# Patient Record
Sex: MALE | Race: WHITE
[De-identification: names, ages, dates, MRNs, and addresses within clinical notes are randomized per-mention and may not be internally consistent; named-entity substitution may affect disease eponyms.]

---

## 2017-06-19 ENCOUNTER — HOSPITAL ENCOUNTER (OUTPATIENT)
Dept: HOSPITAL 8 - OUT | Age: 47
Discharge: HOME | End: 2017-06-19
Attending: NEUROLOGICAL SURGERY
Payer: MEDICARE

## 2017-06-19 VITALS — HEIGHT: 75 IN | BODY MASS INDEX: 33.17 KG/M2 | WEIGHT: 266.76 LBS

## 2017-06-19 VITALS — SYSTOLIC BLOOD PRESSURE: 131 MMHG | DIASTOLIC BLOOD PRESSURE: 90 MMHG

## 2017-06-19 DIAGNOSIS — I42.9: ICD-10-CM

## 2017-06-19 DIAGNOSIS — Z88.6: ICD-10-CM

## 2017-06-19 DIAGNOSIS — I50.9: ICD-10-CM

## 2017-06-19 DIAGNOSIS — M51.36: ICD-10-CM

## 2017-06-19 DIAGNOSIS — M48.06: Primary | ICD-10-CM

## 2017-06-19 PROCEDURE — 77003 FLUOROGUIDE FOR SPINE INJECT: CPT

## 2017-06-19 PROCEDURE — 72132 CT LUMBAR SPINE W/DYE: CPT

## 2017-06-19 PROCEDURE — 72120 X-RAY BEND ONLY L-S SPINE: CPT

## 2017-06-19 PROCEDURE — 62284 INJECTION FOR MYELOGRAM: CPT

## 2017-10-03 ENCOUNTER — HOSPITAL ENCOUNTER (OUTPATIENT)
Dept: HOSPITAL 8 - STAR | Age: 47
Discharge: HOME | End: 2017-10-03
Attending: NEUROLOGICAL SURGERY
Payer: MEDICARE

## 2017-10-03 DIAGNOSIS — Z01.818: Primary | ICD-10-CM

## 2017-10-03 DIAGNOSIS — I50.9: ICD-10-CM

## 2017-10-03 DIAGNOSIS — Z87.891: ICD-10-CM

## 2017-10-03 DIAGNOSIS — M48.02: ICD-10-CM

## 2017-10-03 DIAGNOSIS — Z95.0: ICD-10-CM

## 2017-10-03 LAB
AST SERPL-CCNC: 20 U/L (ref 15–37)
BUN SERPL-MCNC: 10 MG/DL (ref 7–18)
HCT VFR BLD CALC: 46.9 % (ref 39.2–51.8)
HGB BLD-MCNC: 15.8 G/DL (ref 13.7–18)
WBC # BLD AUTO: 9.7 X10^3/UL (ref 3.4–10)

## 2017-10-03 PROCEDURE — 71020: CPT

## 2017-10-03 PROCEDURE — 85025 COMPLETE CBC W/AUTO DIFF WBC: CPT

## 2017-10-03 PROCEDURE — 85610 PROTHROMBIN TIME: CPT

## 2017-10-03 PROCEDURE — 36415 COLL VENOUS BLD VENIPUNCTURE: CPT

## 2017-10-03 PROCEDURE — 85730 THROMBOPLASTIN TIME PARTIAL: CPT

## 2017-10-03 PROCEDURE — 80053 COMPREHEN METABOLIC PANEL: CPT

## 2017-10-03 PROCEDURE — 93005 ELECTROCARDIOGRAM TRACING: CPT

## 2017-10-09 ENCOUNTER — HOSPITAL ENCOUNTER (OUTPATIENT)
Dept: HOSPITAL 8 - CVU | Age: 47
Discharge: HOME | End: 2017-10-09
Attending: INTERNAL MEDICINE
Payer: MEDICARE

## 2017-10-09 DIAGNOSIS — I51.7: Primary | ICD-10-CM

## 2017-10-09 DIAGNOSIS — I42.9: ICD-10-CM

## 2017-10-09 DIAGNOSIS — I37.1: ICD-10-CM

## 2017-10-09 DIAGNOSIS — Z95.810: ICD-10-CM

## 2017-10-09 PROCEDURE — 93306 TTE W/DOPPLER COMPLETE: CPT

## 2017-10-10 ENCOUNTER — HOSPITAL ENCOUNTER (OUTPATIENT)
Dept: HOSPITAL 8 - OUT | Age: 47
Discharge: HOME | End: 2017-10-10
Attending: NEUROLOGICAL SURGERY
Payer: MEDICARE

## 2017-10-10 VITALS — HEIGHT: 74 IN | BODY MASS INDEX: 33.1 KG/M2 | WEIGHT: 257.94 LBS

## 2017-10-10 VITALS — SYSTOLIC BLOOD PRESSURE: 107 MMHG | DIASTOLIC BLOOD PRESSURE: 69 MMHG

## 2017-10-10 DIAGNOSIS — Z87.891: ICD-10-CM

## 2017-10-10 DIAGNOSIS — M54.16: ICD-10-CM

## 2017-10-10 DIAGNOSIS — M47.816: Primary | ICD-10-CM

## 2017-10-10 DIAGNOSIS — Z72.89: ICD-10-CM

## 2017-10-10 DIAGNOSIS — Z88.5: ICD-10-CM

## 2017-10-10 DIAGNOSIS — Z87.39: ICD-10-CM

## 2017-10-10 PROCEDURE — 63056 DECOMPRESS SPINAL CORD LMBR: CPT

## 2017-10-10 PROCEDURE — 72100 X-RAY EXAM L-S SPINE 2/3 VWS: CPT

## 2017-10-10 PROCEDURE — 63047 LAM FACETEC & FORAMOT LUMBAR: CPT

## 2017-10-10 RX ADMIN — FENTANYL CITRATE PRN MCG: 50 INJECTION INTRAMUSCULAR; INTRAVENOUS at 15:10

## 2017-10-10 RX ADMIN — FENTANYL CITRATE PRN MCG: 50 INJECTION INTRAMUSCULAR; INTRAVENOUS at 15:51

## 2017-10-10 RX ADMIN — HYDROMORPHONE HYDROCHLORIDE PRN MG: 1 INJECTION, SOLUTION INTRAMUSCULAR; INTRAVENOUS; SUBCUTANEOUS at 15:49

## 2017-10-10 RX ADMIN — HYDROMORPHONE HYDROCHLORIDE PRN MG: 1 INJECTION, SOLUTION INTRAMUSCULAR; INTRAVENOUS; SUBCUTANEOUS at 15:44

## 2017-10-10 RX ADMIN — FENTANYL CITRATE PRN MCG: 50 INJECTION INTRAMUSCULAR; INTRAVENOUS at 15:17

## 2019-01-22 ENCOUNTER — HOSPITAL ENCOUNTER (OUTPATIENT)
Dept: HOSPITAL 8 - CVU | Age: 49
Discharge: HOME | End: 2019-01-22
Attending: INTERNAL MEDICINE
Payer: MEDICARE

## 2019-01-22 DIAGNOSIS — I42.9: Primary | ICD-10-CM

## 2019-01-22 DIAGNOSIS — Z86.79: ICD-10-CM

## 2019-01-22 PROCEDURE — 93306 TTE W/DOPPLER COMPLETE: CPT

## 2019-03-23 ENCOUNTER — HOSPITAL ENCOUNTER (INPATIENT)
Dept: HOSPITAL 8 - ED | Age: 49
LOS: 1 days | Discharge: HOME | DRG: 313 | End: 2019-03-24
Attending: HOSPITALIST | Admitting: HOSPITALIST
Payer: MEDICARE

## 2019-03-23 VITALS — WEIGHT: 293.66 LBS | BODY MASS INDEX: 37.69 KG/M2 | HEIGHT: 74 IN

## 2019-03-23 VITALS — SYSTOLIC BLOOD PRESSURE: 136 MMHG | DIASTOLIC BLOOD PRESSURE: 76 MMHG

## 2019-03-23 DIAGNOSIS — E66.2: ICD-10-CM

## 2019-03-23 DIAGNOSIS — Z87.891: ICD-10-CM

## 2019-03-23 DIAGNOSIS — I50.9: ICD-10-CM

## 2019-03-23 DIAGNOSIS — M10.9: ICD-10-CM

## 2019-03-23 DIAGNOSIS — I11.0: ICD-10-CM

## 2019-03-23 DIAGNOSIS — I42.9: ICD-10-CM

## 2019-03-23 DIAGNOSIS — K21.9: ICD-10-CM

## 2019-03-23 DIAGNOSIS — Z23: ICD-10-CM

## 2019-03-23 DIAGNOSIS — Z98.84: ICD-10-CM

## 2019-03-23 DIAGNOSIS — R07.89: Primary | ICD-10-CM

## 2019-03-23 DIAGNOSIS — H53.8: ICD-10-CM

## 2019-03-23 DIAGNOSIS — Z95.810: ICD-10-CM

## 2019-03-23 LAB
ALBUMIN SERPL-MCNC: 4.1 G/DL (ref 3.4–5)
ALP SERPL-CCNC: 109 U/L (ref 45–117)
ALT SERPL-CCNC: 24 U/L (ref 12–78)
ANION GAP SERPL CALC-SCNC: 7 MMOL/L (ref 5–15)
BASOPHILS # BLD AUTO: 0.02 X10^3/UL (ref 0–0.1)
BASOPHILS NFR BLD AUTO: 0 % (ref 0–1)
BILIRUB SERPL-MCNC: 0.6 MG/DL (ref 0.2–1)
CALCIUM SERPL-MCNC: 8.5 MG/DL (ref 8.5–10.1)
CHLORIDE SERPL-SCNC: 103 MMOL/L (ref 98–107)
CREAT SERPL-MCNC: 1.12 MG/DL (ref 0.7–1.3)
EOSINOPHIL # BLD AUTO: 0.16 X10^3/UL (ref 0–0.4)
EOSINOPHIL NFR BLD AUTO: 2 % (ref 1–7)
ERYTHROCYTE [DISTWIDTH] IN BLOOD BY AUTOMATED COUNT: 15.4 % (ref 9.4–14.8)
LYMPHOCYTES # BLD AUTO: 2.16 X10^3/UL (ref 1–3.4)
LYMPHOCYTES NFR BLD AUTO: 24 % (ref 22–44)
MCH RBC QN AUTO: 28.2 PG (ref 27.5–34.5)
MCHC RBC AUTO-ENTMCNC: 32.8 G/DL (ref 33.2–36.2)
MCV RBC AUTO: 85.7 FL (ref 81–97)
MD: NO
MONOCYTES # BLD AUTO: 0.58 X10^3/UL (ref 0.2–0.8)
MONOCYTES NFR BLD AUTO: 6 % (ref 2–9)
NEUTROPHILS # BLD AUTO: 6.26 X10^3/UL (ref 1.8–6.8)
NEUTROPHILS NFR BLD AUTO: 68 % (ref 42–75)
PLATELET # BLD AUTO: 292 X10^3/UL (ref 130–400)
PMV BLD AUTO: 7.4 FL (ref 7.4–10.4)
PROT SERPL-MCNC: 7.1 G/DL (ref 6.4–8.2)
RBC # BLD AUTO: 4.66 X10^6/UL (ref 4.38–5.82)
TROPONIN I SERPL-MCNC: < 0.015 NG/ML (ref 0–0.04)
TROPONIN I SERPL-MCNC: < 0.015 NG/ML (ref 0–0.04)

## 2019-03-23 PROCEDURE — 80053 COMPREHEN METABOLIC PANEL: CPT

## 2019-03-23 PROCEDURE — C9898 INPNT STAY RADIOLABELED ITEM: HCPCS

## 2019-03-23 PROCEDURE — 78452 HT MUSCLE IMAGE SPECT MULT: CPT

## 2019-03-23 PROCEDURE — 70450 CT HEAD/BRAIN W/O DYE: CPT

## 2019-03-23 PROCEDURE — 80061 LIPID PANEL: CPT

## 2019-03-23 PROCEDURE — 71045 X-RAY EXAM CHEST 1 VIEW: CPT

## 2019-03-23 PROCEDURE — 93308 TTE F-UP OR LMTD: CPT

## 2019-03-23 PROCEDURE — 84484 ASSAY OF TROPONIN QUANT: CPT

## 2019-03-23 PROCEDURE — 93325 DOPPLER ECHO COLOR FLOW MAPG: CPT

## 2019-03-23 PROCEDURE — 83880 ASSAY OF NATRIURETIC PEPTIDE: CPT

## 2019-03-23 PROCEDURE — 80048 BASIC METABOLIC PNL TOTAL CA: CPT

## 2019-03-23 PROCEDURE — 93017 CV STRESS TEST TRACING ONLY: CPT

## 2019-03-23 PROCEDURE — 90656 IIV3 VACC NO PRSV 0.5 ML IM: CPT

## 2019-03-23 PROCEDURE — A9502 TC99M TETROFOSMIN: HCPCS

## 2019-03-23 PROCEDURE — 93005 ELECTROCARDIOGRAM TRACING: CPT

## 2019-03-23 PROCEDURE — 93880 EXTRACRANIAL BILAT STUDY: CPT

## 2019-03-23 PROCEDURE — 36415 COLL VENOUS BLD VENIPUNCTURE: CPT

## 2019-03-23 PROCEDURE — 85025 COMPLETE CBC W/AUTO DIFF WBC: CPT

## 2019-03-23 PROCEDURE — 93321 DOPPLER ECHO F-UP/LMTD STD: CPT

## 2019-03-23 PROCEDURE — 99285 EMERGENCY DEPT VISIT HI MDM: CPT

## 2019-03-23 NOTE — NUR
WIFE LEFT FOR A FEW MINUTES. STILL WAITING FOR TELE BED. PT SEEN BY 
HOSPITALIST. AWAITING FURTHER ORDERS.

## 2019-03-23 NOTE — NUR
PT RESTING ON GURNEY, RR EVEN AND UNLABORED. PT ON CONT SPO2, BP, AND CARDIAC 
MONITOR, VSS. AWAITING TELE ROOM ATT.

## 2019-03-23 NOTE — NUR
3 P'S ADDRESSED.  VSS. PT UP TO RESTROOM TO VOID.  STILL WAITING FOR TELE BED. 
PER SUPERVISOR, BED SHOULD BE SOON AND NO NEED TO HAVE PT PLACED ON HOSPITAL 
BED IN THE ER.

## 2019-03-24 VITALS — SYSTOLIC BLOOD PRESSURE: 133 MMHG | DIASTOLIC BLOOD PRESSURE: 71 MMHG

## 2019-03-24 VITALS — SYSTOLIC BLOOD PRESSURE: 136 MMHG | DIASTOLIC BLOOD PRESSURE: 71 MMHG

## 2019-03-24 VITALS — SYSTOLIC BLOOD PRESSURE: 99 MMHG | DIASTOLIC BLOOD PRESSURE: 50 MMHG

## 2019-03-24 LAB
ANION GAP SERPL CALC-SCNC: 4 MMOL/L (ref 5–15)
BASOPHILS # BLD AUTO: 0.03 X10^3/UL (ref 0–0.1)
BASOPHILS NFR BLD AUTO: 0 % (ref 0–1)
CALCIUM SERPL-MCNC: 8.7 MG/DL (ref 8.5–10.1)
CHLORIDE SERPL-SCNC: 108 MMOL/L (ref 98–107)
CHOL/HDL RATIO: 4.3
CREAT SERPL-MCNC: 1.19 MG/DL (ref 0.7–1.3)
EOSINOPHIL # BLD AUTO: 0.22 X10^3/UL (ref 0–0.4)
EOSINOPHIL NFR BLD AUTO: 2 % (ref 1–7)
ERYTHROCYTE [DISTWIDTH] IN BLOOD BY AUTOMATED COUNT: 14.9 % (ref 9.4–14.8)
HDL CHOL %: 24 % (ref 26–37)
HDL CHOLESTEROL (DIRECT): 36 MG/DL (ref 40–60)
LDL CHOLESTEROL,CALCULATED: 98 MG/DL (ref 54–169)
LDLC/HDLC SERPL: 2.7 {RATIO} (ref 0.5–3)
LYMPHOCYTES # BLD AUTO: 3.13 X10^3/UL (ref 1–3.4)
LYMPHOCYTES NFR BLD AUTO: 34 % (ref 22–44)
MCH RBC QN AUTO: 27.9 PG (ref 27.5–34.5)
MCHC RBC AUTO-ENTMCNC: 32.7 G/DL (ref 33.2–36.2)
MCV RBC AUTO: 85.5 FL (ref 81–97)
MD: NO
MONOCYTES # BLD AUTO: 0.81 X10^3/UL (ref 0.2–0.8)
MONOCYTES NFR BLD AUTO: 9 % (ref 2–9)
NEUTROPHILS # BLD AUTO: 5.03 X10^3/UL (ref 1.8–6.8)
NEUTROPHILS NFR BLD AUTO: 55 % (ref 42–75)
PLATELET # BLD AUTO: 263 X10^3/UL (ref 130–400)
PMV BLD AUTO: 7.7 FL (ref 7.4–10.4)
RBC # BLD AUTO: 4.66 X10^6/UL (ref 4.38–5.82)
TRIGL SERPL-MCNC: 95 MG/DL (ref 50–200)
VLDLC SERPL CALC-MCNC: 19 MG/DL (ref 0–25)

## 2021-07-30 ENCOUNTER — HOSPITAL ENCOUNTER (OUTPATIENT)
Dept: HOSPITAL 8 - CVU | Age: 51
Discharge: HOME | End: 2021-07-30
Attending: PHYSICIAN ASSISTANT
Payer: MEDICARE

## 2021-07-30 DIAGNOSIS — I42.9: ICD-10-CM

## 2021-07-30 DIAGNOSIS — I08.2: Primary | ICD-10-CM

## 2021-07-30 PROCEDURE — 93306 TTE W/DOPPLER COMPLETE: CPT

## 2021-07-30 PROCEDURE — 93356 MYOCRD STRAIN IMG SPCKL TRCK: CPT

## 2021-08-03 ENCOUNTER — HOSPITAL ENCOUNTER (OUTPATIENT)
Dept: HOSPITAL 8 - STAR | Age: 51
Discharge: HOME | End: 2021-08-03
Attending: INTERNAL MEDICINE
Payer: MEDICARE

## 2021-08-03 DIAGNOSIS — Z12.11: ICD-10-CM

## 2021-08-03 DIAGNOSIS — K21.9: ICD-10-CM

## 2021-08-03 DIAGNOSIS — R19.5: ICD-10-CM

## 2021-08-03 DIAGNOSIS — Z01.818: Primary | ICD-10-CM

## 2021-08-03 DIAGNOSIS — R13.10: ICD-10-CM

## 2021-08-03 DIAGNOSIS — E66.9: ICD-10-CM

## 2021-08-03 LAB
ALBUMIN SERPL-MCNC: 3.8 G/DL (ref 3.4–5)
ALP SERPL-CCNC: 103 U/L (ref 45–117)
ALT SERPL-CCNC: 22 U/L (ref 12–78)
ANION GAP SERPL CALC-SCNC: 8 MMOL/L (ref 5–15)
BILIRUB SERPL-MCNC: 0.3 MG/DL (ref 0.2–1)
CALCIUM SERPL-MCNC: 9.1 MG/DL (ref 8.5–10.1)
CHLORIDE SERPL-SCNC: 107 MMOL/L (ref 98–107)
CREAT SERPL-MCNC: 1.15 MG/DL (ref 0.7–1.3)
PROT SERPL-MCNC: 7.6 G/DL (ref 6.4–8.2)

## 2021-08-03 PROCEDURE — 80053 COMPREHEN METABOLIC PANEL: CPT

## 2021-08-03 PROCEDURE — 93005 ELECTROCARDIOGRAM TRACING: CPT

## 2021-08-03 PROCEDURE — 36415 COLL VENOUS BLD VENIPUNCTURE: CPT
